# Patient Record
Sex: MALE | Race: WHITE | Employment: FULL TIME | URBAN - METROPOLITAN AREA
[De-identification: names, ages, dates, MRNs, and addresses within clinical notes are randomized per-mention and may not be internally consistent; named-entity substitution may affect disease eponyms.]

---

## 2019-07-25 ENCOUNTER — HOSPITAL ENCOUNTER (OUTPATIENT)
Dept: PHYSICAL THERAPY | Age: 54
Discharge: HOME | End: 2019-07-25
Payer: COMMERCIAL

## 2019-07-25 PROCEDURE — 97161 PT EVAL LOW COMPLEX 20 MIN: CPT

## 2019-07-25 NOTE — THERAPY EVALUATION
Mariposa Joseph  : 1965  Primary: Cristal 82 at [place] [id number] [address]  [phone]   [phone]        OUTPATIENT PHYSICAL THERAPY:Initial Assessment 2019   ICD-10: Treatment Diagnosis: Adhesive Capsulitis of Shoulder, R [M75.01]; Pain in Right Shoulder [M25.511]  Precautions/Allergies:   Patient has no allergy information on record. TREATMENT PLAN:  Effective Dates: 2019 TO 10/23/2019 (90 days). Frequency/Duration: 2 times a week for 90 Day(s) MEDICAL/REFERRING DIAGNOSIS:  Pain in right shoulder [M25.511]   DATE OF ONSET: May 2019  REFERRING PHYSICIAN: Katya Fragoso MD MD Orders: Evaluate and Treat  Return MD Appointment: TBD     INITIAL ASSESSMENT:  Mr. Andrés Wilde presents with signs and symptoms of R shoulder Adhesive Capsulitis. Pt. Demonstrates ROM restrictions for R shoulder internal rotation, external rotation, flexion, and abduction. Myofascial restrictions are present in the pectoralis, subscapularis, and lateral scapular musculature of the R shoulder. Pt. Demonstrates difficulty with ADL activities including dressing, reaching, carrying, et. Pt. Is a good candidate for skilled PT at this time. PROBLEM LIST (Impacting functional limitations):  1. Decreased Strength  2. Decreased ADL/Functional Activities  3. Increased Pain  4. Decreased Activity Tolerance  5. Decreased Flexibility/Joint Mobility  6. Decreased [place] with Home Exercise Program INTERVENTIONS PLANNED: (Treatment may consist of any combination of the following)  1. Home Exercise Program (HEP)  2. Manual Therapy  3. Range of Motion (ROM)  4. Therapeutic Activites  5. Therapeutic Exercise/Strengthening     GOALS: (Goals have been discussed and agreed upon with patient.)  Discharge Goals: Time Frame: 8 weeks  1. Pt. Will sleep for 1 week with < 3/10 R shoulder pain to improve sleep.   2. Pt. Will demonstrate > 150 degrees of R shoulder flexion to improve overhead activities. 3. Pt. Will be independent with HEP to address shoulder limitations. 4. Pt. Will score < 20% disability on the Quick DASH to demonstrate improved pain and function. OUTCOME MEASURE:   Tool Used: Disabilities of the Arm, Shoulder and Hand (DASH) Questionnaire - Quick Version  Score:  Initial: 24/55  Most Recent: X/55 (Date: -- )   Interpretation of Score: The DASH is designed to measure the activities of daily living in person's with upper extremity dysfunction or pain. Each section is scored on a 1-5 scale, 5 representing the greatest disability. The scores of each section are added together for a total score of 55. MEDICAL NECESSITY:   · Patient is expected to demonstrate progress in strength and range of motion to increase independence with ADL's and recreational activity. .  REASON FOR SERVICES/OTHER COMMENTS:   · Patient will benefit from skilled PT to address impairments identified through evaluation and return to previous ADL and recreational capacity. Total Duration:  PT Patient Time In/Time Out  Time In: 1130  Time Out: 1230    Rehabilitation Potential For Stated Goals: Good  Regarding Nicanor Monreal's therapy, I certify that the treatment plan above will be carried out by a therapist or under their direction. Thank you for this referral,  Aiyana Oliveira PT     Referring Physician Signature: Ash Agudelo MD No Signature is Required for this note. PAIN/SUBJECTIVE:   Initial: Pain Intensity 1: 8 /10 Post Session:  7/10   HISTORY:   History of Injury/Illness (Reason for Referral):  Pt. Attends PT c/o R shoulder pain and ROM limitations with gradual insidious onset in May of 2019. Pt. Reports limitations with dressing, overhead activities. Sporting activities, sleeping, and general ADL's. Pt. Denies numbness, tingling, or weakness.   Pt. Would like to return to previous level of function without pain and ROM limitations. Past Medical History/Comorbidities:   Mr. Sharad Gutierrez  has no past medical history on file. Mr. Sharad Gutierrez  has no past surgical history on file. Colon Polyp that was cancerous (surgically removed); R wrist fracture. Social History/Living Environment:     Lives in one story home with spouse  Prior Level of Function/Work/Activity:  Functioned independently without limitations. Dominant Side:         RIGHT   Ambulatory/Rehab Services H2 Model Falls Risk Assessment   Risk Factors:       (1)  Gender [Male] Ability to Rise from Chair:       (0)  Ability to rise in a single movement   Falls Prevention Plan:       No modifications necessary   Total: (5 or greater = High Risk): 1   ©2010 [facility] [employer]. All Rights Reserved. [facility] Patent #[id number]. Federal Law prohibits the replication, distribution or use without written permission from [employer]   Current Medications:     No current outpatient medications on file. Date Last Reviewed:  7/25/2019   Number of Personal Factors/Comorbidities that affect the Plan of Care: 0: LOW COMPLEXITY   EXAMINATION:    ROM:       RIGHT LEFT    Shoulder Abduction 88 140    Shoulder flexion  122  162    Shoulder external rotation  15 90     Shoulder internal rotation  10  60          Strength:       RIGHT  LEFT     Shoulder flexion  4/5 5/5     Shoulder ER  4/5  5/5    Shoulder IR 4/5 5/5    Scapular retraction  5/5  5/5          Flexibility:       RIGHT LEFT    Pectoralis Major/Minor  limited limited     Latissimus Dorsi  limited  limited    Upper trapezius  limited limited           Functional Mobility:       RIGHT LEFT    Apley IR  to gluteals  WNL    Apley ER  to neck  WNL    Horizontal adduction  --  --    Active impingement   --  --          Joint Mobility:        RIGHT LEFT       hypomobile normal                     Body Structures Involved:  1. Joints  2. Muscles Body Functions Affected:  1. Neuromusculoskeletal  2.  Movement Related Activities and Participation Affected:  1. Mobility  2. Self Care  3.  Community, Social and Lamar Fallon   Number of elements (examined above) that affect the Plan of Care: 1-2: LOW COMPLEXITY   CLINICAL PRESENTATION:   Presentation: Stable and uncomplicated: LOW COMPLEXITY   CLINICAL DECISION MAKING:   Use of outcome tool(s) and clinical judgement create a POC that gives a: Clear prediction of patient's progress: LOW COMPLEXITY

## 2019-07-29 ENCOUNTER — HOSPITAL ENCOUNTER (OUTPATIENT)
Dept: PHYSICAL THERAPY | Age: 54
Discharge: HOME | End: 2019-07-29
Payer: COMMERCIAL

## 2019-07-29 PROCEDURE — 97110 THERAPEUTIC EXERCISES: CPT

## 2019-07-29 PROCEDURE — 97140 MANUAL THERAPY 1/> REGIONS: CPT

## 2019-07-29 NOTE — PROGRESS NOTES
Jacinta Morrison  : 1965  Primary: Cristal Balderas at [address], [address]  [phone]   [phone]      OUTPATIENT PHYSICAL THERAPY: Daily Treatment Note 2019  Visit Count:  2    ICD-10: Treatment Diagnosis: Adhesive Capsulitis of Shoulder, R [M75.01]; Pain in Right Shoulder [M25.511]  Precautions/Allergies:   Patient has no allergy information on record. TREATMENT PLAN:  Effective Dates: 2019 TO 10/23/2019 (90 days). Frequency/Duration: 2 times a week for 90 Day(s) MEDICAL/REFERRING DIAGNOSIS:  Pain in right shoulder [M25.511]   DATE OF ONSET: May 2019  REFERRING PHYSICIAN: Greyson Can MD MD Orders: Evaluate and Treat  Return MD Appointment: TBD       Pre-treatment Symptoms/Complaints:  Pt. Notes ROM improves with initial HEP. Pain: Initial: Pain Intensity 1: 7 /10 Post Session:  7/10   Medications Last Reviewed:  2019  Updated Objective Findings:  None Today  TREATMENT:     Therapeutic Exercise: (30 Minutes):  Exercises per grid below to improve mobility and strength. Required minimal verbal and manual cues to promote proper body alignment, promote proper body posture and promote proper body mechanics. Progressed resistance, range and repetitions as indicated. Date:  2019   Activity/Exercise Parameters   Pulleys AAROM flexion/IR 8 minutes   Prone scapular retraction 3 x 20   Rows (green band) 3 x 20   Pectoralis stretch 5 minutes   Biceps stretch 2 minutes   UBE 5 minutes       Manual Therapy (    Soft Tissue Mobilization Duration  Duration: 15 Minutes): Manual techniques to facilitate improved motion and decreased pain.  (Used abbreviations: MET - muscle energy technique; PNF - proprioceptive neuromuscular facilitation; NMR - neuromuscular re-education; a/p - anterior to posterior; p/a - posterior to anterior)   · Joint mobilization: grade IV and V; IV in prone central p/a and grade V in supine a/p  · Soft tissue mobilization: infraspinatus, latissimus, subscapularis, pectoralis  · Joint mobilization: R glenohumeral inferior and posterior glides grade III      MedBridge Portal  Treatment/Session Summary:    · Response to Treatment:  Pt. demonstrates improved R shoulder flexion and external rotation following PT today. Internal rotation remains significantly limited. .  · Communication/Consultation:  None today  · Equipment provided today:  None today  · Recommendations/Intent for next treatment session: Next visit will focus on ROM and manual therapy.     Total Treatment Billable Duration:  45 minutes  PT Patient Time In/Time Out  Time In: 1330  Time Out: [address] [facility], PT    Future Appointments   Date Time Provider Madhu Faulkner   7/31/2019 10:30 AM Neal Foley, PT [facility]   8/13/2019 10:30 AM Neal Foley, PT [facility]   8/15/2019 10:30 AM Neal Foley, PT [facility]   8/20/2019 10:30 AM Neal Foley, PT [facility]   8/22/2019 10:30 AM Nealneymar Foley, PT [facility]   8/27/2019 10:30 AM Neal Alaina, PT [facility]   8/29/2019 10:30 AM Neal Foley, PT [facility]

## 2019-07-31 ENCOUNTER — HOSPITAL ENCOUNTER (OUTPATIENT)
Dept: PHYSICAL THERAPY | Age: 54
Discharge: HOME | End: 2019-07-31
Payer: COMMERCIAL

## 2019-07-31 PROCEDURE — 97140 MANUAL THERAPY 1/> REGIONS: CPT

## 2019-07-31 PROCEDURE — 97110 THERAPEUTIC EXERCISES: CPT

## 2019-07-31 NOTE — PROGRESS NOTES
Margaret Oseguera  : 1965  Primary: Cristal 80 at [address]  [phone]   [phone]      OUTPATIENT PHYSICAL THERAPY: Daily Treatment Note 2019  Visit Count:  3    ICD-10: Treatment Diagnosis: Adhesive Capsulitis of Shoulder, R [M75.01]; Pain in Right Shoulder [M25.511]  Precautions/Allergies:   Patient has no allergy information on record. TREATMENT PLAN:  Effective Dates: 2019 TO 10/23/2019 (90 days). Frequency/Duration: 2 times a week for 90 Day(s) MEDICAL/REFERRING DIAGNOSIS:  Pain in right shoulder [M25.511]   DATE OF ONSET: May 2019  REFERRING PHYSICIAN: Royer Coulter MD MD Orders: Evaluate and Treat  Return MD Appointment: TBD       Pre-treatment Symptoms/Complaints:  Pt. Reports good progress with ROM in the R shoulder. Pt is to see referring physician tomorrow. Pain: Initial: Pain Intensity 1: 6 /10 Post Session:  6/10   Medications Last Reviewed:  2019  Updated Objective Findings:  None Today  TREATMENT:     Therapeutic Exercise: (30 Minutes):  Exercises per grid below to improve mobility and strength. Required minimal verbal and manual cues to promote proper body alignment, promote proper body posture and promote proper body mechanics. Progressed resistance, range and repetitions as indicated. Date:  2019   Activity/Exercise Parameters   Pulleys AAROM flexion/IR 4 minutes   Prone scapular retraction 3 x 20   Rows (green band) 3 x 20   Pectoralis stretch 5 minutes   Biceps stretch 2 minutes   UBE 5 minutes   Shoulder abduction AAROM 4 mintues   Manual Therapy (    Soft Tissue Mobilization Duration  Duration: 15 Minutes): Manual techniques to facilitate improved motion and decreased pain.  (Used abbreviations: MET - muscle energy technique; PNF - proprioceptive neuromuscular facilitation; NMR - neuromuscular re-education; a/p - anterior to posterior; p/a - posterior to anterior)   · Joint mobilization: grade IV and V; IV in prone central p/a and grade V in supine a/p  · Soft tissue mobilization: infraspinatus, latissimus, subscapularis, pectoralis  · Joint mobilization: R glenohumeral inferior and posterior glides grade III      MedBridge Portal  Treatment/Session Summary:    · Response to Treatment:  AROM R shoulder abduction improves within session today. Pt. demonstrates good progress with glenohumeral ROM in all directions since starting PT. .  · Communication/Consultation:  None today  · Equipment provided today:  None today  · Recommendations/Intent for next treatment session: Next visit will focus on ROM and manual therapy.     Total Treatment Billable Duration:  45 minutes  PT Patient Time In/Time Out  Time In: 1030  Time Out: [facility],     Future Appointments   Date Time Provider Madhu Faulkner   8/13/2019 10:30 AM Bekah Zeng, PT [facility]   8/15/2019 10:30 AM Bekah Zeng, PT [facility]   8/20/2019 10:30 AM Bekah Zeng, PT [facility]   8/22/2019 10:30 AM Bekah Zeng, PT [facility]   8/27/2019 10:30 AM Bekah Zeng, PT [facility]   8/29/2019 10:30 AM Bekah Zeng, PT [facility]

## 2019-08-13 ENCOUNTER — HOSPITAL ENCOUNTER (OUTPATIENT)
Dept: PHYSICAL THERAPY | Age: 54
Discharge: HOME | End: 2019-08-13
Payer: COMMERCIAL

## 2019-08-13 PROCEDURE — 97110 THERAPEUTIC EXERCISES: CPT

## 2019-08-13 PROCEDURE — 97140 MANUAL THERAPY 1/> REGIONS: CPT

## 2019-08-13 NOTE — PROGRESS NOTES
Margaret Oseguera  : 1965  Primary: Cristal 80 at [place] [id number] [address]  [phone]   [phone]      OUTPATIENT PHYSICAL THERAPY: Daily Treatment Note 2019  Visit Count:  4    ICD-10: Treatment Diagnosis: Adhesive Capsulitis of Shoulder, R [M75.01]; Pain in Right Shoulder [M25.511]  Precautions/Allergies:   Patient has no allergy information on record. TREATMENT PLAN:  Effective Dates: 2019 TO 10/23/2019 (90 days). Frequency/Duration: 2 times a week for 90 Day(s) MEDICAL/REFERRING DIAGNOSIS:  Pain in right shoulder [M25.511]   DATE OF ONSET: May 2019  REFERRING PHYSICIAN: Royer Coulter MD MD Orders: Evaluate and Treat  Return MD Appointment: TBD       Pre-treatment Symptoms/Complaints:  Pt. Notes continued improvement in R shoulder ROM and pain. Pt. Reports f/u with referring MD whom recommended current course of intervention. Pain: Initial: Pain Intensity 1: 10 Post Session:  4/10   Medications Last Reviewed:  2019  Updated Objective Findings:  R shoulder flexion and abduction within normal limits, R ER and IR limited. TREATMENT:     Therapeutic Exercise: (30 Minutes):  Exercises per grid below to improve mobility and strength. Required minimal verbal and manual cues to promote proper body alignment, promote proper body posture and promote proper body mechanics. Progressed resistance, range and repetitions as indicated. Date:  2019   Activity/Exercise Parameters   Pulleys AAROM flexion/IR 4 minutes   Prone scapular retraction 3 x 20   Rows (red band) 3 x 20   Pectoralis stretch 5 minutes   Biceps stretch 2 minutes   UBE 5 minutes   Shoulder abduction AAROM --   Sidelying shoulder ER (orange band) 3 x 10       Manual Therapy (    Soft Tissue Mobilization Duration  Duration: 15 Minutes): Manual techniques to facilitate improved motion and decreased pain.  (Used abbreviations: MET - muscle energy technique; PNF - proprioceptive neuromuscular facilitation; NMR - neuromuscular re-education; a/p - anterior to posterior; p/a - posterior to anterior)   · Joint mobilization: grade IV and V; IV in prone central p/a and grade V in supine a/p (NOT PERFORMED)  · Soft tissue mobilization: infraspinatus, latissimus, subscapularis, pectoralis  · Joint mobilization: R glenohumeral inferior and posterior glides grade III  · Dry needling: R infraspinatus and subscapularis      MedBridge Portal  Treatment/Session Summary:    · Response to Treatment:  Pt. continues to demonstrate improved R shoulder ROM. R shoulder external rotation and internal rotation remain limited. Pt. is initiated on dry needling techniques to address remaining limitations. Pt. tolerates dry needling without complaints and minimal post treatment soreness. .  · Communication/Consultation:  None today  · Equipment provided today:  None today  · Recommendations/Intent for next treatment session: Next visit will focus on ROM and manual therapy.     Total Treatment Billable Duration:  45 minutes  PT Patient Time In/Time Out  Time In: 1030  Time Out: [address] [facility] ANNE Turner    Future Appointments   Date Time Provider Madhu Faulkner   8/15/2019 10:30 AM [facility]   8/20/2019 10:30 AM [facility] Font[facility]   8/22/2019 10:30 AM [facility]   8/27/2019 10:30 AM [facility]   8/29/2019 10:30 AM Stef Morrell PT [facility]

## 2019-08-15 ENCOUNTER — HOSPITAL ENCOUNTER (OUTPATIENT)
Dept: PHYSICAL THERAPY | Age: 54
Discharge: HOME | End: 2019-08-15
Payer: COMMERCIAL

## 2019-08-15 PROCEDURE — 97110 THERAPEUTIC EXERCISES: CPT

## 2019-08-15 PROCEDURE — 97140 MANUAL THERAPY 1/> REGIONS: CPT

## 2019-08-15 NOTE — PROGRESS NOTES
Ashley Chicago  : 1965  Primary: Cristal 80 at [place] [address] [address]  [phone]   [phone]      OUTPATIENT PHYSICAL THERAPY: Daily Treatment Note 8/15/2019  Visit Count:  5    ICD-10: Treatment Diagnosis: Adhesive Capsulitis of Shoulder, R [M75.01]; Pain in Right Shoulder [M25.511]  Precautions/Allergies:   Patient has no allergy information on record. TREATMENT PLAN:  Effective Dates: 2019 TO 10/23/2019 (90 days). Frequency/Duration: 2 times a week for 90 Day(s) MEDICAL/REFERRING DIAGNOSIS:  Pain in right shoulder [M25.511]   DATE OF ONSET: May 2019  REFERRING PHYSICIAN: Mayco Suggs MD MD Orders: Evaluate and Treat  Return MD Appointment: TBD       Pre-treatment Symptoms/Complaints:  Pt. Reports mild soreness following last PT session that resolved yesterday. Pain: Initial: Pain Intensity 1: 4 /10 Post Session:  4/10   Medications Last Reviewed:  8/15/2019  Updated Objective Findings:  None Today  TREATMENT:     Therapeutic Exercise: (30 Minutes):  Exercises per grid below to improve mobility and strength. Required minimal verbal and manual cues to promote proper body alignment, promote proper body posture and promote proper body mechanics. Progressed resistance, range and repetitions as indicated. Date:  8/15/2019   Activity/Exercise Parameters   Pulleys AAROM flexion/IR 4 minutes   Prone scapular retraction 3 x 20   Rows (red band) 3 x 20   Pectoralis stretch --   Biceps stretch 2 minutes   UBE 5 minutes   Shoulder abduction AAROM --   Sidelying shoulder ER (orange band) 3 x 10   Sleeper stretch 2 minutes   Shoulder extension (orange band) 50 repetitions   Manual Therapy (    Soft Tissue Mobilization Duration  Duration: 15 Minutes): Manual techniques to facilitate improved motion and decreased pain.  (Used abbreviations: MET - muscle energy technique; PNF - proprioceptive neuromuscular facilitation; NMR - neuromuscular re-education; a/p - anterior to posterior; p/a - posterior to anterior)   · Joint mobilization: grade IV and V; IV in prone central p/a and grade V in supine a/p  · Soft tissue mobilization: infraspinatus, latissimus, subscapularis, pectoralis  · Joint mobilization: R glenohumeral inferior and posterior glides grade III  · Dry needling: R infraspinatus and subscapularis (NOT PERFORMED)      MedBridge Portal  Treatment/Session Summary:    · Response to Treatment:  R shoulder IR ROM improves today with AAROM exercises. Pt. tolerates shoulder and scapular strengthening exercises today without complaints. .  · Communication/Consultation:  None today  · Equipment provided today:  None today  · Recommendations/Intent for next treatment session: Next visit will focus on manual therapy and strengthening exercises.      Total Treatment Billable Duration:  45 minutes  PT Patient Time In/Time Out  Time In: 1030  Time Out: [facility],     Future Appointments   Date Time Provider Madhu Faulkner   8/20/2019 10:30 AM [facility]   8/22/2019 10:30 AM Gerald Moe PT [facility]   8/27/2019 10:30 AM Gerald Moe PT [facility]   8/29/2019 10:30 AM Gerald Moe PT [facility]

## 2019-08-20 ENCOUNTER — APPOINTMENT (OUTPATIENT)
Dept: PHYSICAL THERAPY | Age: 54
End: 2019-08-20
Payer: COMMERCIAL

## 2019-08-22 ENCOUNTER — HOSPITAL ENCOUNTER (OUTPATIENT)
Dept: PHYSICAL THERAPY | Age: 54
Discharge: HOME | End: 2019-08-22
Payer: COMMERCIAL

## 2019-08-22 PROCEDURE — 97110 THERAPEUTIC EXERCISES: CPT

## 2019-08-22 PROCEDURE — 97140 MANUAL THERAPY 1/> REGIONS: CPT

## 2019-08-22 NOTE — PROGRESS NOTES
Shiv Whatley  : 1965  Primary: Gloriatal 80 at [place] [address] [address], [address]  [phone]   [phone]      OUTPATIENT PHYSICAL THERAPY: Daily Treatment Note 2019  Visit Count:  6    ICD-10: Treatment Diagnosis: Adhesive Capsulitis of Shoulder, R [M75.01]; Pain in Right Shoulder [M25.511]  Precautions/Allergies:   Patient has no allergy information on record. TREATMENT PLAN:  Effective Dates: 2019 TO 10/23/2019 (90 days). Frequency/Duration: 2 times a week for 90 Day(s) MEDICAL/REFERRING DIAGNOSIS:  Pain in right shoulder [M25.511]   DATE OF ONSET: May 2019  REFERRING PHYSICIAN: Mamie Manzano MD MD Orders: Evaluate and Treat  Return MD Appointment: TBD       Pre-treatment Symptoms/Complaints:  Pt. Notes remaining difficulty is reaching behind his back with the R arm. Pain: Initial: Pain Intensity 1: 4 10 Post Session:  4/10   Medications Last Reviewed:  2019  Updated Objective Findings:  R shoulder ER (70 degrees), R shoulder IR 20 degrees  TREATMENT:     Therapeutic Exercise: (30 Minutes):  Exercises per grid below to improve mobility and strength. Required minimal verbal and manual cues to promote proper body alignment, promote proper body posture and promote proper body mechanics. Progressed resistance, range and repetitions as indicated. Date:  2019   Activity/Exercise Parameters   Pulleys AAROM IR 4 minutes   Prone scapular retraction 3 x 20   Rows (red band) 3 x 20   Pectoralis stretch 5 minutes   Biceps stretch 2 minutes   UBE 5 minutes   Shoulder abduction AAROM --   Sidelying shoulder ER (orange band) 3 x 10   Sleeper stretch --   Shoulder extension (orange band) --   Manual Therapy (    Soft Tissue Mobilization Duration  Duration: 15 Minutes): Manual techniques to facilitate improved motion and decreased pain.  (Used abbreviations: MET - muscle energy technique; PNF - proprioceptive neuromuscular facilitation; NMR - neuromuscular re-education; a/p - anterior to posterior; p/a - posterior to anterior)   · Joint mobilization: grade IV and V; IV in prone central p/a and grade V in supine a/p  · Soft tissue mobilization: infraspinatus, latissimus, subscapularis, pectoralis  · Joint mobilization: R glenohumeral inferior and posterior glides grade III  · Dry needling: R infraspinatus and subscapularis (NOT PERFORMED)      MedBridge Portal  Treatment/Session Summary:    · Response to Treatment:  Pt. continues to demonstrate limitations with R shoulder IR.  ROM is more restricted at 90 degrees of shoulder flexion versus 0 degrees of shoulder flexion. .  · Communication/Consultation:  None today  · Equipment provided today:  None today  · Recommendations/Intent for next treatment session: Next visit will focus on manual therapy and strengthening exercises.      Total Treatment Billable Duration:  45 minutes  PT Patient Time In/Time Out  Time In: 1030  Time Out: [address], PT    Future Appointments   Date Time Provider Madhu Faulkner   8/27/2019 10:30 AM [facility]   [facility]

## 2019-08-27 ENCOUNTER — APPOINTMENT (OUTPATIENT)
Dept: PHYSICAL THERAPY | Age: 54
End: 2019-08-27
Payer: COMMERCIAL

## 2019-08-29 ENCOUNTER — HOSPITAL ENCOUNTER (OUTPATIENT)
Dept: PHYSICAL THERAPY | Age: 54
Discharge: HOME | End: 2019-08-29
Payer: COMMERCIAL

## 2019-08-29 PROCEDURE — 97140 MANUAL THERAPY 1/> REGIONS: CPT

## 2019-08-29 PROCEDURE — 97110 THERAPEUTIC EXERCISES: CPT

## 2019-08-29 NOTE — PROGRESS NOTES
Jordan Moulton  : 1965  Primary: Cristal [address] at [address]  [phone]   [phone]      OUTPATIENT PHYSICAL THERAPY: Daily Treatment Note 2019  Visit Count:  7    ICD-10: Treatment Diagnosis: Adhesive Capsulitis of Shoulder, R [M75.01]; Pain in Right Shoulder [M25.511]  Precautions/Allergies:   Patient has no allergy information on record. TREATMENT PLAN:  Effective Dates: 2019 TO 10/23/2019 (90 days). Frequency/Duration: 2 times a week for 90 Day(s) MEDICAL/REFERRING DIAGNOSIS:  Pain in right shoulder [M25.511]   DATE OF ONSET: May 2019  REFERRING PHYSICIAN: Brandon Chavez MD MD Orders: Evaluate and Treat  Return MD Appointment: TBD       Pre-treatment Symptoms/Complaints:  Pt. Reports stiffness in the R shoulder upon waking in the AM.     Pain: Initial: Pain Intensity 1: 3 10 Post Session:  3/10   Medications Last Reviewed:  2019  Updated Objective Findings:  None Today  TREATMENT:     Therapeutic Exercise: (30 Minutes):  Exercises per grid below to improve mobility and strength. Required minimal verbal and manual cues to promote proper body alignment, promote proper body posture and promote proper body mechanics. Progressed resistance, range and repetitions as indicated. Date:  2019   Activity/Exercise Parameters   Pulleys AAROM IR 4 minutes   Prone scapular retraction --   Rows (red band) 3 x 20   Pectoralis stretch 5 minutes   Biceps stretch 2 minutes   UBE 5 minutes   Shoulder abduction AAROM --   Sidelying shoulder ER (orange band) 3 x 10   Sleeper stretch --   Shoulder extension (orange band) --   T's and I's 3 x 10   Kettle bell carry (8#) 3 minutes   Manual Therapy (    Soft Tissue Mobilization Duration  Duration: 15 Minutes): Manual techniques to facilitate improved motion and decreased pain.  (Used abbreviations: MET - muscle energy technique; PNF - proprioceptive neuromuscular facilitation; NMR - neuromuscular re-education; a/p - anterior to posterior; p/a - posterior to anterior)   · Joint mobilization: grade IV and V; IV in prone central p/a and grade V in supine a/p  · Soft tissue mobilization: infraspinatus, latissimus, subscapularis, pectoralis  · Joint mobilization: R glenohumeral inferior and posterior glides grade III  · Dry needling: R infraspinatus and subscapularis (NOT PERFORMED)      MedBridge Portal  Treatment/Session Summary:    · Response to Treatment:  R shoulder IR improves today. Combined IR, extension, and adduction is now to L2-3. · Communication/Consultation:  None today  · Equipment provided today:  None today  · Recommendations/Intent for next treatment session: Next visit will focus on manual therapy and strengthening exercises.      Total Treatment Billable Duration:  45 minutes  PT Patient Time In/Time Out  Time In: 1030  Time Out: [id number] [facility] Johnny, ANNE    Future Appointments   Date Time Provider Madhu Faulkner   9/5/2019 10:30 AM ANNE Jose [facility]   9/12/2019 10:30 AM Annette Plaza PT SADE [facility]

## 2019-09-05 ENCOUNTER — HOSPITAL ENCOUNTER (OUTPATIENT)
Dept: PHYSICAL THERAPY | Age: 54
Discharge: HOME | End: 2019-09-05
Payer: COMMERCIAL

## 2019-09-05 PROCEDURE — 97110 THERAPEUTIC EXERCISES: CPT

## 2019-09-05 PROCEDURE — 97140 MANUAL THERAPY 1/> REGIONS: CPT

## 2019-09-05 NOTE — PROGRESS NOTES
Sabas Grijalva  : 1965  Primary: Cristal 80 at [place] [id number] [address]  [phone]   [phone]      OUTPATIENT PHYSICAL THERAPY: Daily Treatment Note 2019  Visit Count:  8    ICD-10: Treatment Diagnosis: Adhesive Capsulitis of Shoulder, R [M75.01]; Pain in Right Shoulder [M25.511]  Precautions/Allergies:   Patient has no allergy information on record. TREATMENT PLAN:  Effective Dates: 2019 TO 10/23/2019 (90 days). Frequency/Duration: 2 times a week for 90 Day(s) MEDICAL/REFERRING DIAGNOSIS:  Pain in right shoulder [M25.511]   DATE OF ONSET: May 2019  REFERRING PHYSICIAN: Sherrell Navas MD MD Orders: Evaluate and Treat  Return MD Appointment: TBD       Pre-treatment Symptoms/Complaints:  Pt. Denies new symptoms in the R shoulder this week. Pain: Initial: Pain Intensity 1: 3 /10 Post Session:  3/10   Medications Last Reviewed:  2019  Updated Objective Findings:  R shoulder ER at 90 degrees: 70, R IR at 90 degrees: 30  TREATMENT:     Therapeutic Exercise: (30 Minutes):  Exercises per grid below to improve mobility and strength. Required minimal verbal and manual cues to promote proper body alignment, promote proper body posture and promote proper body mechanics. Progressed resistance, range and repetitions as indicated. Date:  2019   Activity/Exercise Parameters   Pulleys AAROM IR --   Prone scapular retraction --   Rows (red band) 3 x 20   Pectoralis stretch 5 minutes   Biceps stretch 2 minutes   UBE 5 minutes   Shoulder abduction AAROM --   Sidelying shoulder ER (orange band) 3 x 10   Sleeper stretch --   Shoulder extension (orange band) --   T's and I's 3 x 10   BovControl bell carry (8#) --   PNF D2 extension 3 x 10   Manual Therapy (    Soft Tissue Mobilization Duration  Duration: 15 Minutes): Manual techniques to facilitate improved motion and decreased pain.  (Used abbreviations: MET - muscle energy technique; PNF - proprioceptive neuromuscular facilitation; NMR - neuromuscular re-education; a/p - anterior to posterior; p/a - posterior to anterior)   · Joint mobilization: grade IV and V; IV in prone central p/a and grade V in supine a/p  · Soft tissue mobilization: infraspinatus, latissimus, subscapularis, pectoralis (NOT PERFORMED)  · Joint mobilization: R glenohumeral inferior and posterior glides grade III  · Dry needling: R infraspinatus, teres major, latissimus dorsi      MedBridge Portal  Treatment/Session Summary:    · Response to Treatment:  R shoulder ER and IR remain restricted. Scapular winging is present in the R side during PNF patterns today indicating motor control/strength deficits. Pt. tolerates dry needling today without complaints and minimal post treatment soreness. .  · Communication/Consultation:  None today  · Equipment provided today:  None today  · Recommendations/Intent for next treatment session: Next visit will focus on manual therapy and strengthening exercises.      Total Treatment Billable Duration:  45 minutes  PT Patient Time In/Time Out  Time In: 1030  Time Out: [id number] [facility] ANNE Turner    Future Appointments   Date Time Provider Madhu Faulkner   9/12/2019 10:30 AM [facility]

## 2019-09-12 ENCOUNTER — HOSPITAL ENCOUNTER (OUTPATIENT)
Dept: PHYSICAL THERAPY | Age: 54
Discharge: HOME | End: 2019-09-12
Payer: COMMERCIAL

## 2019-09-12 PROCEDURE — 97140 MANUAL THERAPY 1/> REGIONS: CPT

## 2019-09-12 PROCEDURE — 97110 THERAPEUTIC EXERCISES: CPT

## 2019-09-12 NOTE — THERAPY DISCHARGE
Lizzy Deepa  : 1965  Primary: Cristal Balderas at [place] [address] [address]  [phone]   [phone]        OUTPATIENT PHYSICAL THERAPY:Discharge Summary 2019   ICD-10: Treatment Diagnosis: Adhesive Capsulitis of Shoulder, R [M75.01]; Pain in Right Shoulder [M25.511]  Precautions/Allergies:   Patient has no allergy information on record. TREATMENT PLAN:  Effective Dates: 2019 TO 10/23/2019 (90 days). Frequency/Duration: 2 times a week for 90 Day(s) MEDICAL/REFERRING DIAGNOSIS:  Pain in right shoulder [M25.511]   DATE OF ONSET: May 2019  REFERRING PHYSICIAN: Marina Villaseñor MD MD Orders: Evaluate and Treat  Return MD Appointment: TBD     INITIAL ASSESSMENT:  Mr. Karl Kan presents with signs and symptoms of R shoulder Adhesive Capsulitis. Pt. Demonstrates ROM restrictions for R shoulder internal rotation, external rotation, flexion, and abduction. Myofascial restrictions are present in the pectoralis, subscapularis, and lateral scapular musculature of the R shoulder. Pt. Demonstrates difficulty with ADL activities including dressing, reaching, carrying, et. Pt. Is a good candidate for skilled PT at this time. 19 Discharge Summary: Pt. Attends 9 physical therapy visits. Pt. Edwyna Brisk all set goals since initial evaluation. Pt. Demonstrates improved performance with ADL's and experiences minimal pain in the R shoulder. ROM deficits remain, especially for R shoulder internal rotation. Pt. ie expected to continue to improve R shoulder ROM with prescribed HEP and time per recovery period from adhesive capsulitis. Pt. Is to discharge at this time. PROBLEM LIST (Impacting functional limitations):  1. Decreased Strength  2. Decreased ADL/Functional Activities  3. Increased Pain  4. Decreased Activity Tolerance  5. Decreased Flexibility/Joint Mobility  6.  Decreased [place] with Home Exercise Program INTERVENTIONS PLANNED: (Treatment may consist of any combination of the following)  1. Home Exercise Program (HEP)  2. Manual Therapy  3. Range of Motion (ROM)  4. Therapeutic Activites  5. Therapeutic Exercise/Strengthening     GOALS: (Goals have been discussed and agreed upon with patient.)  Discharge Goals: Time Frame: 8 weeks  1. Pt. Will sleep for 1 week with < 3/10 R shoulder pain to improve sleep. MET  2. Pt. Will demonstrate > 150 degrees of R shoulder flexion to improve overhead activities. MET   3. Pt. Will be independent with HEP to address shoulder limitations. MET  4. Pt. Will score < 20% disability on the Quick DASH to demonstrate improved pain and function. MET     OUTCOME MEASURE:   Tool Used: Disabilities of the Arm, Shoulder and Hand (DASH) Questionnaire - Quick Version  Score:  Initial: 24/55  Most Recent: 15/55 (Date: 9/12/19 )   Interpretation of Score: The DASH is designed to measure the activities of daily living in person's with upper extremity dysfunction or pain. Each section is scored on a 1-5 scale, 5 representing the greatest disability. The scores of each section are added together for a total score of 55.       UPDATED OBJECTIVE FINDINGS:    ROM:      RIGHT LEFT   Shoulder Abduction 152 155   Shoulder flexion  160  162   Shoulder external rotation  70 90    Shoulder internal rotation  38  60        Strength:      RIGHT  LEFT    Shoulder flexion  5/5 5/5    Shoulder ER  4+/5  5/5   Shoulder IR 4+/5 5/5   Scapular retraction  5/5  5/5        Flexibility:      RIGHT LEFT   Pectoralis Major/Minor  limited limited    Latissimus Dorsi  limited  limited   Upper trapezius  limited limited         Functional Mobility:      RIGHT LEFT   Apley IR  to L4  WNL   Apley ER  WNL  WNL   Horizontal adduction  --  --   Active impingement   --  --                  Ryanne Vazquez, PT

## 2019-09-12 NOTE — PROGRESS NOTES
Binta Ear  : 1965  Primary: Cristal 80 at [address], [address]  [phone]   [phone]      OUTPATIENT PHYSICAL THERAPY: Daily Treatment Note 2019  Visit Count:  9    ICD-10: Treatment Diagnosis: Adhesive Capsulitis of Shoulder, R [M75.01]; Pain in Right Shoulder [M25.511]  Precautions/Allergies:   Patient has no allergy information on record. TREATMENT PLAN:  Effective Dates: 2019 TO 10/23/2019 (90 days). Frequency/Duration: 2 times a week for 90 Day(s) MEDICAL/REFERRING DIAGNOSIS:  Pain in right shoulder [M25.511]   DATE OF ONSET: May 2019  REFERRING PHYSICIAN: Jenny Steiner MD MD Orders: Evaluate and Treat  Return MD Appointment: TBD       Pre-treatment Symptoms/Complaints:  Pt. Reports dry needling seemed to help with residual pain. Pt. Notes he is confident to discharge at this time with HEP. Pain: Initial: Pain Intensity 1: 2 /10 Post Session:  10   Medications Last Reviewed:  2019  Updated Objective Findings:  See evaluation note from today  TREATMENT:     Therapeutic Exercise: (30 Minutes):  Exercises per grid below to improve mobility and strength. Required minimal verbal and manual cues to promote proper body alignment, promote proper body posture and promote proper body mechanics. Progressed resistance, range and repetitions as indicated.    Date:  2019   Activity/Exercise Parameters   Pulleys AAROM IR 4 minutes   Prone scapular retraction --   Rows (red band) 3 x 20   Pectoralis stretch 5 minutes   Biceps stretch 2 minutes   UBE 5 minutes   Shoulder abduction AAROM --   Sidelying shoulder ER (orange band) 3 x 10   Sleeper stretch --   Shoulder extension (orange band) --   T's and I's 3 x 10   Kettle bell carry (8#) --   PNF D2 extension 3 x 10   Manual Therapy (    Soft Tissue Mobilization Duration  Duration: 15 Minutes): Manual techniques to facilitate improved motion and decreased pain. (Used abbreviations: MET - muscle energy technique; PNF - proprioceptive neuromuscular facilitation; NMR - neuromuscular re-education; a/p - anterior to posterior; p/a - posterior to anterior)   · Joint mobilization: grade IV and V; IV in prone central p/a and grade V in supine a/p  · Soft tissue mobilization: infraspinatus, latissimus, subscapularis, pectoralis   · Joint mobilization: R glenohumeral inferior and posterior glides grade III      MedBridge Portal  Treatment/Session Summary:    · Response to Treatment:  Pt. demonstrates appropriate psychomotor performance of HEP. Pt. will discharge at this time with HEP. Mirian Alexander · Communication/Consultation:  None today  · Equipment provided today:  None today    Total Treatment Billable Duration:  45 minutes  PT Patient Time In/Time Out  Time In: 1030  Time Out: [id number] [facility] Johnny, PT    No future appointments.